# Patient Record
Sex: MALE | Race: BLACK OR AFRICAN AMERICAN | NOT HISPANIC OR LATINO | URBAN - METROPOLITAN AREA
[De-identification: names, ages, dates, MRNs, and addresses within clinical notes are randomized per-mention and may not be internally consistent; named-entity substitution may affect disease eponyms.]

---

## 2018-04-25 ENCOUNTER — EMERGENCY (EMERGENCY)
Facility: HOSPITAL | Age: 23
LOS: 1 days | Discharge: HOME | End: 2018-04-25
Admitting: EMERGENCY MEDICINE
Payer: COMMERCIAL

## 2018-04-25 VITALS
OXYGEN SATURATION: 96 % | DIASTOLIC BLOOD PRESSURE: 70 MMHG | TEMPERATURE: 99 F | HEART RATE: 73 BPM | RESPIRATION RATE: 16 BRPM | WEIGHT: 190.04 LBS | HEIGHT: 69 IN | SYSTOLIC BLOOD PRESSURE: 126 MMHG

## 2018-04-25 DIAGNOSIS — J02.9 ACUTE PHARYNGITIS, UNSPECIFIED: ICD-10-CM

## 2018-04-25 DIAGNOSIS — B34.9 VIRAL INFECTION, UNSPECIFIED: ICD-10-CM

## 2018-04-25 DIAGNOSIS — Z79.1 LONG TERM (CURRENT) USE OF NON-STEROIDAL ANTI-INFLAMMATORIES (NSAID): ICD-10-CM

## 2018-04-25 DIAGNOSIS — Z79.899 OTHER LONG TERM (CURRENT) DRUG THERAPY: ICD-10-CM

## 2018-04-25 LAB — S PYO AG SPEC QL IA: NEGATIVE — SIGNIFICANT CHANGE UP

## 2018-04-25 PROCEDURE — 99282 EMERGENCY DEPT VISIT SF MDM: CPT

## 2018-04-25 PROCEDURE — 99283 EMERGENCY DEPT VISIT LOW MDM: CPT

## 2018-04-25 PROCEDURE — 87880 STREP A ASSAY W/OPTIC: CPT

## 2018-04-25 PROCEDURE — 87081 CULTURE SCREEN ONLY: CPT

## 2018-04-25 NOTE — ED PROVIDER NOTE - OBJECTIVE STATEMENT
22 y/o m 24 y/o m with no pmh presents to ED c/o sore throat for 4-5 days. He state odynophagia but tolerating PO. Denies fever, ear pain, nasal congestion , rhinorrhea, cough, sob, chest pain, n, v, d, abd pain.

## 2023-12-04 NOTE — ED ADULT NURSE NOTE - NS ED NURSE DC INFO COMPLEXITY
Klonopin refilled, patient reports having withdrawal symptoms.    Simple: Patient demonstrates quick and easy understanding